# Patient Record
Sex: FEMALE | Race: OTHER | NOT HISPANIC OR LATINO
[De-identification: names, ages, dates, MRNs, and addresses within clinical notes are randomized per-mention and may not be internally consistent; named-entity substitution may affect disease eponyms.]

---

## 2018-02-26 PROBLEM — Z00.00 ENCOUNTER FOR PREVENTIVE HEALTH EXAMINATION: Status: ACTIVE | Noted: 2018-02-26

## 2018-03-07 ENCOUNTER — APPOINTMENT (OUTPATIENT)
Dept: WOUND CARE | Facility: CLINIC | Age: 73
End: 2018-03-07

## 2018-03-12 ENCOUNTER — APPOINTMENT (OUTPATIENT)
Dept: VASCULAR SURGERY | Facility: CLINIC | Age: 73
End: 2018-03-12
Payer: MEDICARE

## 2018-03-12 ENCOUNTER — APPOINTMENT (OUTPATIENT)
Dept: WOUND CARE | Facility: CLINIC | Age: 73
End: 2018-03-12
Payer: MEDICARE

## 2018-03-12 DIAGNOSIS — I73.9 PERIPHERAL VASCULAR DISEASE, UNSPECIFIED: ICD-10-CM

## 2018-03-12 DIAGNOSIS — M79.606 PAIN IN LEG, UNSPECIFIED: ICD-10-CM

## 2018-03-12 DIAGNOSIS — I87.2 VENOUS INSUFFICIENCY (CHRONIC) (PERIPHERAL): ICD-10-CM

## 2018-03-12 PROCEDURE — 99204 OFFICE O/P NEW MOD 45 MIN: CPT

## 2018-03-12 PROCEDURE — 93922 UPR/L XTREMITY ART 2 LEVELS: CPT

## 2018-03-12 PROCEDURE — 93970 EXTREMITY STUDY: CPT

## 2018-03-12 RX ORDER — CARVEDILOL 6.25 MG/1
6.25 TABLET, FILM COATED ORAL
Qty: 180 | Refills: 0 | Status: ACTIVE | COMMUNITY
Start: 2017-08-11

## 2018-03-12 RX ORDER — LOSARTAN POTASSIUM 25 MG/1
25 TABLET, FILM COATED ORAL
Qty: 90 | Refills: 0 | Status: ACTIVE | COMMUNITY
Start: 2017-10-18

## 2018-03-12 RX ORDER — APIXABAN 5 MG/1
5 TABLET, FILM COATED ORAL
Qty: 60 | Refills: 0 | Status: ACTIVE | COMMUNITY
Start: 2017-08-20

## 2018-03-12 RX ORDER — INSULIN DETEMIR 100 [IU]/ML
100 INJECTION, SOLUTION SUBCUTANEOUS
Qty: 15 | Refills: 0 | Status: ACTIVE | COMMUNITY
Start: 2016-12-13

## 2018-03-12 RX ORDER — GLIMEPIRIDE 2 MG/1
2 TABLET ORAL
Qty: 30 | Refills: 0 | Status: ACTIVE | COMMUNITY
Start: 2017-06-10

## 2018-03-12 RX ORDER — VENLAFAXINE HYDROCHLORIDE 75 MG/1
75 CAPSULE, EXTENDED RELEASE ORAL
Qty: 30 | Refills: 0 | Status: ACTIVE | COMMUNITY
Start: 2017-11-02

## 2018-03-12 RX ORDER — GABAPENTIN 100 MG/1
100 CAPSULE ORAL
Qty: 60 | Refills: 0 | Status: ACTIVE | COMMUNITY
Start: 2017-11-16

## 2018-03-12 RX ORDER — FUROSEMIDE 20 MG/1
20 TABLET ORAL
Qty: 90 | Refills: 0 | Status: ACTIVE | COMMUNITY
Start: 2017-10-18

## 2018-03-15 ENCOUNTER — APPOINTMENT (OUTPATIENT)
Dept: VASCULAR SURGERY | Facility: CLINIC | Age: 73
End: 2018-03-15
Payer: MEDICARE

## 2018-03-15 ENCOUNTER — OTHER (OUTPATIENT)
Age: 73
End: 2018-03-15

## 2018-03-15 ENCOUNTER — APPOINTMENT (OUTPATIENT)
Dept: VASCULAR SURGERY | Facility: CLINIC | Age: 73
End: 2018-03-15

## 2018-03-15 VITALS
BODY MASS INDEX: 40.75 KG/M2 | HEIGHT: 63 IN | DIASTOLIC BLOOD PRESSURE: 78 MMHG | TEMPERATURE: 97.8 F | SYSTOLIC BLOOD PRESSURE: 123 MMHG | WEIGHT: 230 LBS | HEART RATE: 69 BPM

## 2018-03-15 PROCEDURE — 93926 LOWER EXTREMITY STUDY: CPT

## 2018-03-15 PROCEDURE — 99204 OFFICE O/P NEW MOD 45 MIN: CPT

## 2018-03-15 PROCEDURE — 99214 OFFICE O/P EST MOD 30 MIN: CPT

## 2018-03-19 ENCOUNTER — LABORATORY RESULT (OUTPATIENT)
Age: 73
End: 2018-03-19

## 2018-03-23 ENCOUNTER — OUTPATIENT (OUTPATIENT)
Dept: OUTPATIENT SERVICES | Facility: HOSPITAL | Age: 73
LOS: 1 days | End: 2018-03-23
Payer: COMMERCIAL

## 2018-03-23 ENCOUNTER — APPOINTMENT (OUTPATIENT)
Dept: CT IMAGING | Facility: CLINIC | Age: 73
End: 2018-03-23
Payer: MEDICARE

## 2018-03-23 DIAGNOSIS — I73.9 PERIPHERAL VASCULAR DISEASE, UNSPECIFIED: ICD-10-CM

## 2018-03-23 PROCEDURE — 75635 CT ANGIO ABDOMINAL ARTERIES: CPT

## 2018-03-23 PROCEDURE — 75635 CT ANGIO ABDOMINAL ARTERIES: CPT | Mod: 26

## 2018-04-05 ENCOUNTER — INPATIENT (INPATIENT)
Facility: HOSPITAL | Age: 73
LOS: 0 days | Discharge: HOME | End: 2018-04-06
Attending: SURGERY | Admitting: SURGERY
Payer: MEDICARE

## 2018-04-05 ENCOUNTER — APPOINTMENT (OUTPATIENT)
Dept: VASCULAR SURGERY | Facility: HOSPITAL | Age: 73
End: 2018-04-05

## 2018-04-05 VITALS
DIASTOLIC BLOOD PRESSURE: 63 MMHG | OXYGEN SATURATION: 100 % | HEIGHT: 63 IN | RESPIRATION RATE: 18 BRPM | HEART RATE: 60 BPM | SYSTOLIC BLOOD PRESSURE: 158 MMHG | TEMPERATURE: 98 F | WEIGHT: 250.89 LBS

## 2018-04-05 DIAGNOSIS — Z95.810 PRESENCE OF AUTOMATIC (IMPLANTABLE) CARDIAC DEFIBRILLATOR: Chronic | ICD-10-CM

## 2018-04-05 DIAGNOSIS — I73.9 PERIPHERAL VASCULAR DISEASE, UNSPECIFIED: ICD-10-CM

## 2018-04-05 LAB
BUN SERPL-MCNC: 26 MG/DL — HIGH (ref 7–23)
CALCIUM SERPL-MCNC: 9.3 MG/DL — SIGNIFICANT CHANGE UP (ref 8.4–10.5)
CHLORIDE SERPL-SCNC: 99 MMOL/L — SIGNIFICANT CHANGE UP (ref 98–107)
CO2 SERPL-SCNC: 25 MMOL/L — SIGNIFICANT CHANGE UP (ref 22–31)
CREAT SERPL-MCNC: 1.27 MG/DL — SIGNIFICANT CHANGE UP (ref 0.5–1.3)
GLUCOSE SERPL-MCNC: 212 MG/DL — HIGH (ref 70–99)
HCT VFR BLD CALC: 43.3 % — SIGNIFICANT CHANGE UP (ref 34.5–45)
HGB BLD-MCNC: 14.1 G/DL — SIGNIFICANT CHANGE UP (ref 11.5–15.5)
MCHC RBC-ENTMCNC: 29.1 PG — SIGNIFICANT CHANGE UP (ref 27–34)
MCHC RBC-ENTMCNC: 32.6 % — SIGNIFICANT CHANGE UP (ref 32–36)
MCV RBC AUTO: 89.3 FL — SIGNIFICANT CHANGE UP (ref 80–100)
NRBC # FLD: 0 — SIGNIFICANT CHANGE UP
PLATELET # BLD AUTO: 194 K/UL — SIGNIFICANT CHANGE UP (ref 150–400)
PMV BLD: 10.4 FL — SIGNIFICANT CHANGE UP (ref 7–13)
POTASSIUM SERPL-MCNC: 4.6 MMOL/L — SIGNIFICANT CHANGE UP (ref 3.5–5.3)
POTASSIUM SERPL-SCNC: 4.6 MMOL/L — SIGNIFICANT CHANGE UP (ref 3.5–5.3)
RBC # BLD: 4.85 M/UL — SIGNIFICANT CHANGE UP (ref 3.8–5.2)
RBC # FLD: 13.7 % — SIGNIFICANT CHANGE UP (ref 10.3–14.5)
SODIUM SERPL-SCNC: 137 MMOL/L — SIGNIFICANT CHANGE UP (ref 135–145)
WBC # BLD: 6.6 K/UL — SIGNIFICANT CHANGE UP (ref 3.8–10.5)
WBC # FLD AUTO: 6.6 K/UL — SIGNIFICANT CHANGE UP (ref 3.8–10.5)

## 2018-04-05 RX ORDER — GABAPENTIN 400 MG/1
100 CAPSULE ORAL DAILY
Qty: 0 | Refills: 0 | Status: DISCONTINUED | OUTPATIENT
Start: 2018-04-05 | End: 2018-04-06

## 2018-04-05 RX ORDER — VENLAFAXINE HCL 75 MG
75 CAPSULE, EXT RELEASE 24 HR ORAL DAILY
Qty: 0 | Refills: 0 | Status: DISCONTINUED | OUTPATIENT
Start: 2018-04-05 | End: 2018-04-05

## 2018-04-05 RX ORDER — INSULIN LISPRO 100/ML
VIAL (ML) SUBCUTANEOUS AT BEDTIME
Qty: 0 | Refills: 0 | Status: DISCONTINUED | OUTPATIENT
Start: 2018-04-05 | End: 2018-04-06

## 2018-04-05 RX ORDER — DEXTROSE 50 % IN WATER 50 %
1 SYRINGE (ML) INTRAVENOUS ONCE
Qty: 0 | Refills: 0 | Status: DISCONTINUED | OUTPATIENT
Start: 2018-04-05 | End: 2018-04-06

## 2018-04-05 RX ORDER — APIXABAN 2.5 MG/1
1 TABLET, FILM COATED ORAL
Qty: 0 | Refills: 0 | COMMUNITY

## 2018-04-05 RX ORDER — SODIUM CHLORIDE 9 MG/ML
3 INJECTION INTRAMUSCULAR; INTRAVENOUS; SUBCUTANEOUS EVERY 8 HOURS
Qty: 0 | Refills: 0 | Status: DISCONTINUED | OUTPATIENT
Start: 2018-04-05 | End: 2018-04-05

## 2018-04-05 RX ORDER — INSULIN DETEMIR 100/ML (3)
20 INSULIN PEN (ML) SUBCUTANEOUS
Qty: 0 | Refills: 0 | COMMUNITY

## 2018-04-05 RX ORDER — GLUCAGON INJECTION, SOLUTION 0.5 MG/.1ML
1 INJECTION, SOLUTION SUBCUTANEOUS ONCE
Qty: 0 | Refills: 0 | Status: DISCONTINUED | OUTPATIENT
Start: 2018-04-05 | End: 2018-04-06

## 2018-04-05 RX ORDER — CARVEDILOL PHOSPHATE 80 MG/1
12.5 CAPSULE, EXTENDED RELEASE ORAL EVERY 24 HOURS
Qty: 0 | Refills: 0 | Status: DISCONTINUED | OUTPATIENT
Start: 2018-04-05 | End: 2018-04-06

## 2018-04-05 RX ORDER — APIXABAN 2.5 MG/1
5 TABLET, FILM COATED ORAL EVERY 12 HOURS
Qty: 0 | Refills: 0 | Status: DISCONTINUED | OUTPATIENT
Start: 2018-04-06 | End: 2018-04-06

## 2018-04-05 RX ORDER — INSULIN DETEMIR 100/ML (3)
20 INSULIN PEN (ML) SUBCUTANEOUS AT BEDTIME
Qty: 0 | Refills: 0 | Status: DISCONTINUED | OUTPATIENT
Start: 2018-04-05 | End: 2018-04-06

## 2018-04-05 RX ORDER — FUROSEMIDE 40 MG
20 TABLET ORAL DAILY
Qty: 0 | Refills: 0 | Status: DISCONTINUED | OUTPATIENT
Start: 2018-04-06 | End: 2018-04-06

## 2018-04-05 RX ORDER — CARVEDILOL PHOSPHATE 80 MG/1
1 CAPSULE, EXTENDED RELEASE ORAL
Qty: 0 | Refills: 0 | COMMUNITY

## 2018-04-05 RX ORDER — INSULIN LISPRO 100/ML
VIAL (ML) SUBCUTANEOUS
Qty: 0 | Refills: 0 | Status: DISCONTINUED | OUTPATIENT
Start: 2018-04-05 | End: 2018-04-06

## 2018-04-05 RX ORDER — LOSARTAN POTASSIUM 100 MG/1
25 TABLET, FILM COATED ORAL DAILY
Qty: 0 | Refills: 0 | Status: DISCONTINUED | OUTPATIENT
Start: 2018-04-05 | End: 2018-04-06

## 2018-04-05 RX ORDER — CLOPIDOGREL BISULFATE 75 MG/1
75 TABLET, FILM COATED ORAL DAILY
Qty: 0 | Refills: 0 | Status: DISCONTINUED | OUTPATIENT
Start: 2018-04-06 | End: 2018-04-06

## 2018-04-05 RX ORDER — VENLAFAXINE HCL 75 MG
75 CAPSULE, EXT RELEASE 24 HR ORAL DAILY
Qty: 0 | Refills: 0 | Status: DISCONTINUED | OUTPATIENT
Start: 2018-04-05 | End: 2018-04-06

## 2018-04-05 RX ORDER — VENLAFAXINE HCL 75 MG
1 CAPSULE, EXT RELEASE 24 HR ORAL
Qty: 0 | Refills: 0 | COMMUNITY

## 2018-04-05 RX ORDER — GLIMEPIRIDE 1 MG
1 TABLET ORAL
Qty: 0 | Refills: 0 | COMMUNITY

## 2018-04-05 RX ORDER — GABAPENTIN 400 MG/1
1 CAPSULE ORAL
Qty: 0 | Refills: 0 | COMMUNITY

## 2018-04-05 RX ORDER — DEXTROSE 50 % IN WATER 50 %
25 SYRINGE (ML) INTRAVENOUS ONCE
Qty: 0 | Refills: 0 | Status: DISCONTINUED | OUTPATIENT
Start: 2018-04-05 | End: 2018-04-06

## 2018-04-05 RX ORDER — DEXTROSE 50 % IN WATER 50 %
12.5 SYRINGE (ML) INTRAVENOUS ONCE
Qty: 0 | Refills: 0 | Status: DISCONTINUED | OUTPATIENT
Start: 2018-04-05 | End: 2018-04-06

## 2018-04-05 RX ORDER — LOSARTAN POTASSIUM 100 MG/1
1 TABLET, FILM COATED ORAL
Qty: 0 | Refills: 0 | COMMUNITY

## 2018-04-05 RX ORDER — CARVEDILOL PHOSPHATE 80 MG/1
6.25 CAPSULE, EXTENDED RELEASE ORAL DAILY
Qty: 0 | Refills: 0 | Status: DISCONTINUED | OUTPATIENT
Start: 2018-04-06 | End: 2018-04-06

## 2018-04-05 RX ORDER — SODIUM CHLORIDE 9 MG/ML
1000 INJECTION INTRAMUSCULAR; INTRAVENOUS; SUBCUTANEOUS
Qty: 0 | Refills: 0 | Status: DISCONTINUED | OUTPATIENT
Start: 2018-04-05 | End: 2018-04-06

## 2018-04-05 RX ADMIN — Medication 20 UNIT(S): at 22:27

## 2018-04-05 RX ADMIN — Medication 4: at 17:45

## 2018-04-05 RX ADMIN — SODIUM CHLORIDE 75 MILLILITER(S): 9 INJECTION INTRAMUSCULAR; INTRAVENOUS; SUBCUTANEOUS at 17:23

## 2018-04-05 RX ADMIN — CARVEDILOL PHOSPHATE 12.5 MILLIGRAM(S): 80 CAPSULE, EXTENDED RELEASE ORAL at 21:30

## 2018-04-05 NOTE — H&P CARDIOLOGY - RS GEN PE MLT RESP DETAILS PC
no chest wall tenderness/good air movement/clear to auscultation bilaterally/airway patent/respirations non-labored/breath sounds equal

## 2018-04-05 NOTE — H&P CARDIOLOGY - PMH
Atrial fibrillation    CHF (congestive heart failure)    Diabetes    HLD (hyperlipidemia)    HTN (hypertension)    Obesity

## 2018-04-05 NOTE — H&P CARDIOLOGY - NEGATIVE NEUROLOGICAL SYMPTOMS
no hemiparesis/no facial palsy/no transient paralysis/no focal seizures/no paresthesias/no loss of sensation/no headache/no confusion/no syncope/no vertigo/no difficulty walking/no weakness/no generalized seizures/no loss of consciousness/no tremors
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
## 2018-04-06 ENCOUNTER — TRANSCRIPTION ENCOUNTER (OUTPATIENT)
Age: 73
End: 2018-04-06

## 2018-04-06 VITALS
TEMPERATURE: 99 F | HEART RATE: 63 BPM | OXYGEN SATURATION: 97 % | DIASTOLIC BLOOD PRESSURE: 52 MMHG | RESPIRATION RATE: 17 BRPM | SYSTOLIC BLOOD PRESSURE: 122 MMHG

## 2018-04-06 PROCEDURE — 99231 SBSQ HOSP IP/OBS SF/LOW 25: CPT

## 2018-04-06 RX ORDER — CLOPIDOGREL BISULFATE 75 MG/1
1 TABLET, FILM COATED ORAL
Qty: 30 | Refills: 0 | OUTPATIENT
Start: 2018-04-06 | End: 2018-05-05

## 2018-04-06 RX ORDER — FUROSEMIDE 40 MG
1 TABLET ORAL
Qty: 0 | Refills: 0 | COMMUNITY

## 2018-04-06 RX ADMIN — CARVEDILOL PHOSPHATE 6.25 MILLIGRAM(S): 80 CAPSULE, EXTENDED RELEASE ORAL at 05:26

## 2018-04-06 RX ADMIN — Medication 2: at 09:27

## 2018-04-06 RX ADMIN — APIXABAN 5 MILLIGRAM(S): 2.5 TABLET, FILM COATED ORAL at 05:26

## 2018-04-06 RX ADMIN — Medication 20 MILLIGRAM(S): at 05:26

## 2018-04-06 NOTE — DISCHARGE NOTE ADULT - MEDICATION SUMMARY - MEDICATIONS TO TAKE
I will START or STAY ON the medications listed below when I get home from the hospital:    losartan 25 mg oral tablet  -- 1 tab(s) by mouth once a day  -- Indication: For HTN (hypertension)    Eliquis 5 mg oral tablet  -- 1 tab(s) by mouth 2 times a day  -- Indication: For Afib    gabapentin 100 mg oral capsule  -- 1 cap(s) by mouth once a day  -- Indication: For Pain    venlafaxine 75 mg oral tablet, extended release  -- 1 tab(s) by mouth once a day  -- Indication: For Depression    glimepiride 2 mg oral tablet  -- 1 tab(s) by mouth once a day  -- Indication: For Diabetes    Levemir 100 units/mL subcutaneous solution  -- 20 unit(s) subcutaneous once a day (at bedtime)  -- Indication: For Diabetes    clopidogrel 75 mg oral tablet  -- 1 tab(s) by mouth once a day  -- Indication: For stents in right leg    Coreg 6.25 mg oral tablet  -- 1 tab(s) by mouth once a day (in the morning)  -- Indication: For HTN (hypertension)    Coreg 12.5 mg oral tablet  -- 1 tab(s) by mouth once a day (at bedtime)  -- Indication: For HTN (hypertension)

## 2018-04-06 NOTE — DISCHARGE NOTE ADULT - INSTRUCTIONS
You may return to a regular diet. Please NOTIFY MD for any of the following s/s: S/S infection (Fever >100.4, chills, increased redness, increased bleeding, pus-like drainage from incision line), uncontrolled pain not relieved by pain medications, persistent nausea/vomiting or inability to tolerate diet. No heavy lifting; No driving if/while taking narcotic pain medications. Please drink 6-8 glasses of water daily to stay hydrated. Please NOTIFY MD for any of the following s/s: S/S infection (Fever >100.4, chills, increased redness, increased bleeding, pus-like drainage from incision line), uncontrolled pain not relieved by pain medications, persistent nausea/vomiting or inability to tolerate diet. No heavy lifting; No driving if/while taking narcotic pain medications.

## 2018-04-06 NOTE — DISCHARGE NOTE ADULT - PATIENT PORTAL LINK FT
You can access the [facility] Patient Portal, offered by [facility], by registering with the following website: [email or web address]

## 2018-04-06 NOTE — DISCHARGE NOTE ADULT - CARE PLAN
Principal Discharge DX:	Peripheral arterial disease  Goal:	improve ambulation  Assessment and plan of treatment:	You have had two stents placed into the femoral artery of your R leg. To keep these open and help improve blood flow to your foot, please take your plavix (clopidigrel) every day. Additionally, you should continue to ambulate as much as possible to encourage healing and support blood flow.    Please see Dr. Morgan in 1-2 weeks in her office on [facility]. You make take off your dressing today and shower like normal.

## 2018-04-06 NOTE — DISCHARGE NOTE ADULT - PLAN OF CARE
improve ambulation You have had two stents placed into the femoral artery of your R leg. To keep these open and help improve blood flow to your foot, please take your plavix (clopidigrel) every day. Additionally, you should continue to ambulate as much as possible to encourage healing and support blood flow.    Please see Dr. Morgan in 1-2 weeks in her office on [facility]. You make take off your dressing today and shower like normal.

## 2018-04-06 NOTE — DISCHARGE NOTE ADULT - CARE PROVIDER_API CALL
Jerri Morgan), Surgery  [address]  [address]  [place] [address]  Phone: [phone]  Fax: [phone]

## 2018-04-06 NOTE — DISCHARGE NOTE ADULT - HOSPITAL COURSE
73 year old female with claudication pain in her right leg presented for scheduled angioplasty. She underwent the procedure as planned, with placement of stents to her R SFA, and significant improvement in her blood flow. At the end of the procedure, she had a palpable R DP pulse. The arterial closure device did not deploy properly, so she was admitted overnight to allow for 6 hours bedrest with the extremity straight. Patient was started on Plavix and discharged home on Plavix to maintain her stent patency.

## 2018-04-06 NOTE — DISCHARGE NOTE ADULT - CONDITIONS AT DISCHARGE
Pt A&Ox4. Vs stable. Pain well controlled. L. groin incision clean and dry and free from s/s infection with gauze and tape in place. Pt OOB, ambulating in room, and on unit, tolerating consis. carb DASH diet, and voiding adequately.

## 2018-04-10 PROBLEM — I48.91 UNSPECIFIED ATRIAL FIBRILLATION: Chronic | Status: ACTIVE | Noted: 2018-04-05

## 2018-04-10 PROBLEM — I50.9 HEART FAILURE, UNSPECIFIED: Chronic | Status: ACTIVE | Noted: 2018-04-05

## 2018-04-10 PROBLEM — E78.5 HYPERLIPIDEMIA, UNSPECIFIED: Chronic | Status: ACTIVE | Noted: 2018-04-05

## 2018-04-10 PROBLEM — E11.9 TYPE 2 DIABETES MELLITUS WITHOUT COMPLICATIONS: Chronic | Status: ACTIVE | Noted: 2018-04-05

## 2018-04-10 PROBLEM — E66.9 OBESITY, UNSPECIFIED: Chronic | Status: ACTIVE | Noted: 2018-04-05

## 2018-04-10 PROBLEM — I10 ESSENTIAL (PRIMARY) HYPERTENSION: Chronic | Status: ACTIVE | Noted: 2018-04-05

## 2018-04-16 ENCOUNTER — APPOINTMENT (OUTPATIENT)
Dept: VASCULAR SURGERY | Facility: CLINIC | Age: 73
End: 2018-04-16
Payer: MEDICARE

## 2018-04-16 VITALS
WEIGHT: 220 LBS | BODY MASS INDEX: 38.98 KG/M2 | DIASTOLIC BLOOD PRESSURE: 72 MMHG | HEART RATE: 61 BPM | HEIGHT: 63 IN | SYSTOLIC BLOOD PRESSURE: 154 MMHG | TEMPERATURE: 97.3 F

## 2018-04-16 PROCEDURE — 99213 OFFICE O/P EST LOW 20 MIN: CPT

## 2018-04-16 PROCEDURE — 93926 LOWER EXTREMITY STUDY: CPT

## 2018-04-16 RX ORDER — ATORVASTATIN CALCIUM 20 MG/1
20 TABLET, FILM COATED ORAL
Qty: 30 | Refills: 0 | Status: ACTIVE | COMMUNITY
Start: 2018-04-16 | End: 1900-01-01

## 2018-04-16 RX ORDER — CLOPIDOGREL 75 MG/1
75 TABLET, FILM COATED ORAL
Refills: 0 | Status: ACTIVE | COMMUNITY

## 2018-05-07 ENCOUNTER — OTHER (OUTPATIENT)
Age: 73
End: 2018-05-07

## 2018-05-07 RX ORDER — CLOPIDOGREL BISULFATE 75 MG/1
75 TABLET, FILM COATED ORAL DAILY
Qty: 90 | Refills: 3 | Status: ACTIVE | COMMUNITY
Start: 2018-05-07 | End: 1900-01-01

## 2018-05-07 RX ORDER — ATORVASTATIN CALCIUM 80 MG/1
80 TABLET, FILM COATED ORAL
Qty: 90 | Refills: 3 | Status: ACTIVE | COMMUNITY
Start: 2018-05-07 | End: 1900-01-01

## 2018-06-07 ENCOUNTER — RX RENEWAL (OUTPATIENT)
Age: 73
End: 2018-06-07

## 2018-06-07 RX ORDER — CLOPIDOGREL BISULFATE 75 MG/1
75 TABLET, FILM COATED ORAL DAILY
Qty: 90 | Refills: 3 | Status: ACTIVE | COMMUNITY
Start: 2018-04-16 | End: 1900-01-01

## 2018-07-23 PROBLEM — I87.2 VENOUS INSUFFICIENCY: Status: ACTIVE | Noted: 2018-03-12

## 2019-04-02 ENCOUNTER — RX RENEWAL (OUTPATIENT)
Age: 74
End: 2019-04-02

## 2019-04-08 ENCOUNTER — RX RENEWAL (OUTPATIENT)
Age: 74
End: 2019-04-08

## 2019-07-02 ENCOUNTER — RX RENEWAL (OUTPATIENT)
Age: 74
End: 2019-07-02

## 2019-07-08 ENCOUNTER — RX RENEWAL (OUTPATIENT)
Age: 74
End: 2019-07-08

## 2022-03-29 NOTE — PATIENT PROFILE ADULT. - NS PRO CONTRA REFUSE FLU INFO
Risks/benefits discussed with patient or patient surrogate [As Noted in HPI] : as noted in HPI [Abdominal Pain] : abdominal pain [Vomiting] : no vomiting [Constipation] : constipation [Diarrhea] : diarrhea [Negative] : Heme/Lymph

## 2022-10-17 NOTE — PATIENT PROFILE ADULT. - NS PRO CONTRA FLU CONTRAIND
Discussed the importance of blood sugar control in the prevention of ocular complications. Patient/surrogate refused vaccine

## 2024-11-15 NOTE — PATIENT PROFILE ADULT. - PRO SERVICES AT DISCH
[de-identified] : Bilateral knee Inspection:  no effusion  Palpation: no tenderness  Knee Range of Motion:  0-140    Strength: 5/5 Quadriceps strength, 5/5 Hamstring strength  Neurological: light touch is intact throughout  Ligament Stability and Special Tests:   Able to SLR  McMurrays: neg  Lachman: neg  Pivot Shift: neg  Posterior Drawer: neg  Valgus: neg  Varus: neg  Patella Apprehension: neg  Patella Maltracking: neg  Gait: non-antalgic
none